# Patient Record
Sex: MALE | Race: WHITE | NOT HISPANIC OR LATINO | Employment: FULL TIME | URBAN - METROPOLITAN AREA
[De-identification: names, ages, dates, MRNs, and addresses within clinical notes are randomized per-mention and may not be internally consistent; named-entity substitution may affect disease eponyms.]

---

## 2020-10-15 ENCOUNTER — OFFICE VISIT (OUTPATIENT)
Dept: MEDICAL GROUP | Facility: MEDICAL CENTER | Age: 35
End: 2020-10-15
Payer: COMMERCIAL

## 2020-10-15 ENCOUNTER — TELEPHONE (OUTPATIENT)
Dept: SCHEDULING | Facility: IMAGING CENTER | Age: 35
End: 2020-10-15

## 2020-10-15 VITALS
OXYGEN SATURATION: 96 % | BODY MASS INDEX: 33.09 KG/M2 | TEMPERATURE: 98.2 F | DIASTOLIC BLOOD PRESSURE: 78 MMHG | HEIGHT: 72 IN | RESPIRATION RATE: 16 BRPM | HEART RATE: 80 BPM | WEIGHT: 244.27 LBS | SYSTOLIC BLOOD PRESSURE: 130 MMHG

## 2020-10-15 DIAGNOSIS — Z23 NEED FOR VACCINATION: ICD-10-CM

## 2020-10-15 DIAGNOSIS — E11.9 TYPE 2 DIABETES MELLITUS WITHOUT COMPLICATION, WITHOUT LONG-TERM CURRENT USE OF INSULIN (HCC): ICD-10-CM

## 2020-10-15 DIAGNOSIS — Z00.00 ENCOUNTER FOR PREVENTIVE CARE: ICD-10-CM

## 2020-10-15 LAB
HBA1C MFR BLD: 11.1 % (ref 0–5.6)
INT CON NEG: ABNORMAL
INT CON POS: ABNORMAL

## 2020-10-15 PROCEDURE — 90686 IIV4 VACC NO PRSV 0.5 ML IM: CPT | Performed by: INTERNAL MEDICINE

## 2020-10-15 PROCEDURE — 99204 OFFICE O/P NEW MOD 45 MIN: CPT | Mod: 25 | Performed by: INTERNAL MEDICINE

## 2020-10-15 PROCEDURE — 90471 IMMUNIZATION ADMIN: CPT | Performed by: INTERNAL MEDICINE

## 2020-10-15 PROCEDURE — 83036 HEMOGLOBIN GLYCOSYLATED A1C: CPT | Performed by: INTERNAL MEDICINE

## 2020-10-15 RX ORDER — METFORMIN HYDROCHLORIDE 500 MG/1
500 TABLET, EXTENDED RELEASE ORAL 2 TIMES DAILY
Qty: 60 TAB | Refills: 2 | Status: SHIPPED | OUTPATIENT
Start: 2020-10-15 | End: 2020-11-19 | Stop reason: SDUPTHER

## 2020-10-15 RX ORDER — INSULIN GLARGINE 100 [IU]/ML
INJECTION, SOLUTION SUBCUTANEOUS
Qty: 4 EACH | Refills: 3 | Status: SHIPPED | OUTPATIENT
Start: 2020-10-15 | End: 2020-10-26

## 2020-10-15 ASSESSMENT — PATIENT HEALTH QUESTIONNAIRE - PHQ9: CLINICAL INTERPRETATION OF PHQ2 SCORE: 0

## 2020-10-15 NOTE — ASSESSMENT & PLAN NOTE
Diabetic diet, monitor BG    Ref. Range 10/15/2020 16:03   Glycohemoglobin Latest Ref Range: 0.0 - 5.6 % 11.1 (A)   Reinitiate metformin 500 mg BID  Initiate 10 units every evening, increase 2 units every 3 days, until fasting blood glucose level are in target range ( mg/dL)  If hypoglycemia occurs, or fasting level < 80 mg/dL, reduce bedtime dose by 4 units or 10%- whichever is greater.  Monofilament testing with a 10 gram force: sensation intact: intact bilaterally  Visual Inspection: Feet with maceration of 4th toe web bilaterally, recommend topical OTC antifungal  Pedal pulses: intact bilaterally

## 2020-10-15 NOTE — PROGRESS NOTES
New Patient to Establish    Reason to establish: New patient to establish    Joaquin Sepulveda is a 35 y.o. male who presents today with the following:    CC:   Chief Complaint   Patient presents with   • Establish Care   • Diabetes   • Immunizations       HPI:     Type 2 diabetes mellitus without complication, without long-term current use of insulin (HCC)  Diabetic diet, monitor BG    Ref. Range 10/15/2020 16:03   Glycohemoglobin Latest Ref Range: 0.0 - 5.6 % 11.1 (A)   Reinitiate metformin 500 mg BID  Initiate 10 units every evening, increase 2 units every 3 days, until fasting blood glucose level are in target range ( mg/dL)  If hypoglycemia occurs, or fasting level < 80 mg/dL, reduce bedtime dose by 4 units or 10%- whichever is greater.  Monofilament testing with a 10 gram force: sensation intact: intact bilaterally  Visual Inspection: Feet with maceration of 4th toe web bilaterally, recommend topical OTC antifungal  Pedal pulses: intact bilaterally            Current Outpatient Medications:   •  Insulin Pen Needle 32 G x 4 mm, Use one pen needle in pen device to inject insulin once daily., Disp: 100 Each, Rfl: 3  •  insulin glargine (LANTUS SOLOSTAR) 100 UNIT/ML Solution Pen-injector injection, Initiate 10 units every evening, increase 2 units every 3 days, until fasting blood glucose level are in target range ( mg/dL) If hypoglycemia occurs, or fasting level < 80 mg/dL, reduce bedtime dose by 4 units or 10%- whichever is greater., Disp: 4 Each, Rfl: 3  •  metFORMIN ER (GLUCOPHAGE XR) 500 MG TABLET SR 24 HR, Take 1 Tab by mouth 2 times a day., Disp: 60 Tab, Rfl: 2    Allergies, past medical history, past surgical history, medications, family history, social history reviewed and updated.    ROS     Constitutional: Denies fevers or chills  Eyes: Denies changes in vision  Ears/Nose/Throat/Mouth: Denies nasal congestion or sore throat   Cardiovascular: Denies chest pain or palpitations    Respiratory: Denies shortness of breath , Denies cough  Gastrointestinal/Hepatic: Denies abd pain, nausea, vomiting   Genitourinary: Denies dysuria or frequency  Musculoskeletal/Rheum: Denies joint pain and swelling   Neurological: Denies headache  Psychiatric: Denies mood disorder   Endocrine:  hx of diabetes Denies thyroid dysfunction  Heme/Oncology/Lymph Nodes: Denies weight changes or enlarged LNs.    Physical Exam  /78 (BP Location: Left arm, Patient Position: Sitting, BP Cuff Size: Adult)   Pulse 80   Temp 36.8 °C (98.2 °F) (Temporal)   Resp 16   Ht 1.829 m (6')   Wt 110.8 kg (244 lb 4.3 oz)   SpO2 96%   BMI 33.13 kg/m²   General: Normal appearance.  Well developed, well nourished, no acute distress.  HEENT: Normocephalic.  Extraocular motion intact. Pupils are equally round, reactive to light and accommodation, conjunctiva clear, no scleral icterus.  Ears: normal shape and contour, ear canals clear, tympanic membranes intact. Hearing intact.  Oropharynx clear, no erythema, edema or exudate noted.  NECK: Thyroid is not enlarged. No JVD.  No carotid bruits. No masses.  Cardiovascular: Regular rhythm and rate. No murmur/rubs/gallops.   Respiratory: Normal respiratory effort, clear to auscultation bilaterally. No wheezing/rales/rhonchi.    Abdomen: Bowel sounds present, soft, nontender, nondistended, no rebound, no guarding. No hepatosplenomegaly.  : No suprapubic tenderness. No CVA tenderness.   EXT: no LE edema b/l. No cyanosis.  No clubbing.  Lymph: No cervical, supraclavicular or axillary lymph nodes are palpable  Skin: Warm and dry.  No suspicious lesions or rashes.   Neurologic: No focal deficits.    Psych: AAOx3,  Normal mood and affect, normal judgment and insight, memory within normal limits        Assessment and Plan    1. Type 2 diabetes mellitus without complication, without long-term current use of insulin (HCC)  - POCT  A1C 11.1  Diabetic diet, monitor fasting BG  - Comp Metabolic  Panel; Future  - Lipid Profile; Future  - REFERRAL TO OPHTHALMOLOGY  - Diabetic Monofilament LE Exam  - MICROALBUMIN CREAT RATIO URINE; Future  - CORNELIO-65; Future  - INSULIN ANTIBODIES; Future  - Insulin Pen Needle 32 G x 4 mm; Use one pen needle in pen device to inject insulin once daily.  Dispense: 100 Each; Refill: 3  - insulin glargine (LANTUS SOLOSTAR) 100 UNIT/ML Solution Pen-injector injection; Initiate 10 units every evening, increase 2 units every 3 days, until fasting blood glucose level are in target range ( mg/dL) If hypoglycemia occurs, or fasting level < 80 mg/dL, reduce bedtime dose by 4 units or 10%- whichever is greater.  Dispense: 4 Each; Refill: 3  - REFERRAL TO ENDOCRINOLOGY  - REFERRAL TO DIABETIC EDUCATION Diabetes Self Management Education / Training (DSME/T) and Medical Nutrition Therapy (MNT): Initial Group DSME/MNT as authorized by payor, Follow-Up DSME/MNT as authorized by payor; DSME/T Content: Monitoring Diabete...  - metFORMIN ER (GLUCOPHAGE XR) 500 MG TABLET SR 24 HR; Take 1 Tab by mouth 2 times a day.  Dispense: 60 Tab; Refill: 2    2. Need for vaccination  - Influenza Vaccine Quad Injection (PF)    3. Encounter for preventive care  - CBC WITH DIFFERENTIAL; Future  - TSH WITH REFLEX TO FT4; Future  - URINALYSIS; Future  - VITAMIN D,25 HYDROXY; Future        Follow-up:Return in about 1 month (around 11/15/2020), or if symptoms worsen or fail to improve, for Long.    This note was created using voice recognition software. There may be unintended errors in spelling, grammar or content.

## 2020-10-20 ENCOUNTER — HOSPITAL ENCOUNTER (OUTPATIENT)
Dept: LAB | Facility: MEDICAL CENTER | Age: 35
End: 2020-10-20
Attending: INTERNAL MEDICINE
Payer: COMMERCIAL

## 2020-10-20 DIAGNOSIS — Z00.00 ENCOUNTER FOR PREVENTIVE CARE: ICD-10-CM

## 2020-10-20 DIAGNOSIS — E11.9 TYPE 2 DIABETES MELLITUS WITHOUT COMPLICATION, WITHOUT LONG-TERM CURRENT USE OF INSULIN (HCC): ICD-10-CM

## 2020-10-20 LAB
25(OH)D3 SERPL-MCNC: 14 NG/ML (ref 30–100)
ALBUMIN SERPL BCP-MCNC: 4.7 G/DL (ref 3.2–4.9)
ALBUMIN/GLOB SERPL: 1.3 G/DL
ALP SERPL-CCNC: 115 U/L (ref 30–99)
ALT SERPL-CCNC: 38 U/L (ref 2–50)
ANION GAP SERPL CALC-SCNC: 12 MMOL/L (ref 7–16)
APPEARANCE UR: CLEAR
AST SERPL-CCNC: 24 U/L (ref 12–45)
BASOPHILS # BLD AUTO: 0.7 % (ref 0–1.8)
BASOPHILS # BLD: 0.06 K/UL (ref 0–0.12)
BILIRUB SERPL-MCNC: 0.4 MG/DL (ref 0.1–1.5)
BILIRUB UR QL STRIP.AUTO: NEGATIVE
BUN SERPL-MCNC: 10 MG/DL (ref 8–22)
CALCIUM SERPL-MCNC: 9.8 MG/DL (ref 8.4–10.2)
CHLORIDE SERPL-SCNC: 103 MMOL/L (ref 96–112)
CHOLEST SERPL-MCNC: 231 MG/DL (ref 100–199)
CO2 SERPL-SCNC: 22 MMOL/L (ref 20–33)
COLOR UR: YELLOW
CREAT SERPL-MCNC: 0.75 MG/DL (ref 0.5–1.4)
CREAT UR-MCNC: 48.2 MG/DL
EOSINOPHIL # BLD AUTO: 0.11 K/UL (ref 0–0.51)
EOSINOPHIL NFR BLD: 1.3 % (ref 0–6.9)
ERYTHROCYTE [DISTWIDTH] IN BLOOD BY AUTOMATED COUNT: 38.3 FL (ref 35.9–50)
FASTING STATUS PATIENT QL REPORTED: NORMAL
GLOBULIN SER CALC-MCNC: 3.5 G/DL (ref 1.9–3.5)
GLUCOSE SERPL-MCNC: 153 MG/DL (ref 65–99)
GLUCOSE UR STRIP.AUTO-MCNC: NEGATIVE MG/DL
HCT VFR BLD AUTO: 47.7 % (ref 42–52)
HDLC SERPL-MCNC: 40 MG/DL
HGB BLD-MCNC: 16.1 G/DL (ref 14–18)
IMM GRANULOCYTES # BLD AUTO: 0.02 K/UL (ref 0–0.11)
IMM GRANULOCYTES NFR BLD AUTO: 0.2 % (ref 0–0.9)
KETONES UR STRIP.AUTO-MCNC: NEGATIVE MG/DL
LDLC SERPL CALC-MCNC: 152 MG/DL
LEUKOCYTE ESTERASE UR QL STRIP.AUTO: NEGATIVE
LYMPHOCYTES # BLD AUTO: 3.74 K/UL (ref 1–4.8)
LYMPHOCYTES NFR BLD: 43.4 % (ref 22–41)
MCH RBC QN AUTO: 26.7 PG (ref 27–33)
MCHC RBC AUTO-ENTMCNC: 33.8 G/DL (ref 33.7–35.3)
MCV RBC AUTO: 79.2 FL (ref 81.4–97.8)
MICRO URNS: NORMAL
MICROALBUMIN UR-MCNC: 1.7 MG/DL
MICROALBUMIN/CREAT UR: 35 MG/G (ref 0–30)
MONOCYTES # BLD AUTO: 0.57 K/UL (ref 0–0.85)
MONOCYTES NFR BLD AUTO: 6.6 % (ref 0–13.4)
NEUTROPHILS # BLD AUTO: 4.12 K/UL (ref 1.82–7.42)
NEUTROPHILS NFR BLD: 47.8 % (ref 44–72)
NITRITE UR QL STRIP.AUTO: NEGATIVE
NRBC # BLD AUTO: 0 K/UL
NRBC BLD-RTO: 0 /100 WBC
PH UR STRIP.AUTO: 6 [PH] (ref 5–8)
PLATELET # BLD AUTO: 268 K/UL (ref 164–446)
PMV BLD AUTO: 10 FL (ref 9–12.9)
POTASSIUM SERPL-SCNC: 4.4 MMOL/L (ref 3.6–5.5)
PROT SERPL-MCNC: 8.2 G/DL (ref 6–8.2)
PROT UR QL STRIP: NEGATIVE MG/DL
RBC # BLD AUTO: 6.02 M/UL (ref 4.7–6.1)
RBC UR QL AUTO: NEGATIVE
SODIUM SERPL-SCNC: 137 MMOL/L (ref 135–145)
SP GR UR STRIP.AUTO: 1.01
TRIGL SERPL-MCNC: 193 MG/DL (ref 0–149)
TSH SERPL DL<=0.005 MIU/L-ACNC: 1.46 UIU/ML (ref 0.38–5.33)
WBC # BLD AUTO: 8.6 K/UL (ref 4.8–10.8)

## 2020-10-20 PROCEDURE — 36415 COLL VENOUS BLD VENIPUNCTURE: CPT

## 2020-10-20 PROCEDURE — 84443 ASSAY THYROID STIM HORMONE: CPT

## 2020-10-20 PROCEDURE — 81003 URINALYSIS AUTO W/O SCOPE: CPT

## 2020-10-20 PROCEDURE — 86341 ISLET CELL ANTIBODY: CPT

## 2020-10-20 PROCEDURE — 80061 LIPID PANEL: CPT

## 2020-10-20 PROCEDURE — 85025 COMPLETE CBC W/AUTO DIFF WBC: CPT

## 2020-10-20 PROCEDURE — 82570 ASSAY OF URINE CREATININE: CPT

## 2020-10-20 PROCEDURE — 82043 UR ALBUMIN QUANTITATIVE: CPT

## 2020-10-20 PROCEDURE — 86337 INSULIN ANTIBODIES: CPT

## 2020-10-20 PROCEDURE — 80053 COMPREHEN METABOLIC PANEL: CPT

## 2020-10-20 PROCEDURE — 82306 VITAMIN D 25 HYDROXY: CPT

## 2020-10-23 LAB — GAD65 AB SER IA-ACNC: <5 IU/ML (ref 0–5)

## 2020-10-24 LAB — INSULIN HUMAN AB SER-ACNC: <0.4 U/ML (ref 0–0.4)

## 2020-10-26 DIAGNOSIS — E11.9 TYPE 2 DIABETES MELLITUS WITHOUT COMPLICATION, WITHOUT LONG-TERM CURRENT USE OF INSULIN (HCC): ICD-10-CM

## 2020-10-26 RX ORDER — INSULIN GLARGINE 100 [IU]/ML
INJECTION, SOLUTION SUBCUTANEOUS
Qty: 15 ML | Refills: 1 | Status: SHIPPED | OUTPATIENT
Start: 2020-10-26 | End: 2020-11-19

## 2020-10-26 NOTE — PROGRESS NOTES
Called pharmacy, we compared the cost for lantus solostar, toujeo solostar and basaglar kwikPen  The basaglar is the lowest of the three.     Type 2 diabetes mellitus without complication, without long-term current use of insulin (HCC)  -     insulin glargine (BASAGLAR KWIKPEN) 100 UNIT/ML injection PEN; Initiate 10 units every evening, increase 2 units every 3 days, until fasting blood glucose level are in target range ( mg/dL) If hypoglycemia occurs, or fasting level < 80 mg/dL, reduce bedtime dose by 4 units or 10%- whichever is greater.

## 2020-11-19 ENCOUNTER — OFFICE VISIT (OUTPATIENT)
Dept: MEDICAL GROUP | Facility: MEDICAL CENTER | Age: 35
End: 2020-11-19
Payer: COMMERCIAL

## 2020-11-19 VITALS
DIASTOLIC BLOOD PRESSURE: 70 MMHG | HEART RATE: 76 BPM | OXYGEN SATURATION: 96 % | TEMPERATURE: 98.5 F | RESPIRATION RATE: 16 BRPM | BODY MASS INDEX: 33.04 KG/M2 | WEIGHT: 243.94 LBS | SYSTOLIC BLOOD PRESSURE: 104 MMHG | HEIGHT: 72 IN

## 2020-11-19 DIAGNOSIS — E55.9 VITAMIN D DEFICIENCY: ICD-10-CM

## 2020-11-19 DIAGNOSIS — R71.8 MICROCYTOSIS: ICD-10-CM

## 2020-11-19 DIAGNOSIS — R80.9 MICROALBUMINURIA: ICD-10-CM

## 2020-11-19 DIAGNOSIS — R74.8 ELEVATED ALKALINE PHOSPHATASE LEVEL: ICD-10-CM

## 2020-11-19 DIAGNOSIS — E78.5 DYSLIPIDEMIA: ICD-10-CM

## 2020-11-19 DIAGNOSIS — E11.9 TYPE 2 DIABETES MELLITUS WITHOUT COMPLICATION, WITHOUT LONG-TERM CURRENT USE OF INSULIN (HCC): ICD-10-CM

## 2020-11-19 PROCEDURE — 99214 OFFICE O/P EST MOD 30 MIN: CPT | Performed by: INTERNAL MEDICINE

## 2020-11-19 RX ORDER — METFORMIN HYDROCHLORIDE 500 MG/1
500 TABLET, EXTENDED RELEASE ORAL 2 TIMES DAILY
Qty: 60 TAB | Refills: 5 | Status: SHIPPED | OUTPATIENT
Start: 2020-11-19 | End: 2021-02-16

## 2020-11-19 RX ORDER — LISINOPRIL 2.5 MG/1
2.5 TABLET ORAL DAILY
Qty: 30 TAB | Refills: 5 | Status: SHIPPED | OUTPATIENT
Start: 2020-11-19 | End: 2021-02-02

## 2020-11-19 RX ORDER — ROSUVASTATIN CALCIUM 5 MG/1
5 TABLET, COATED ORAL EVERY EVENING
Qty: 30 TAB | Refills: 5 | Status: SHIPPED | OUTPATIENT
Start: 2020-11-19 | End: 2021-05-17

## 2020-11-19 RX ORDER — ERGOCALCIFEROL 1.25 MG/1
50000 CAPSULE ORAL
Qty: 12 CAP | Refills: 0 | Status: SHIPPED | OUTPATIENT
Start: 2020-11-19 | End: 2021-02-16

## 2020-11-19 RX ORDER — INSULIN GLARGINE 100 [IU]/ML
INJECTION, SOLUTION SUBCUTANEOUS
Qty: 15 ML | Refills: 1 | COMMUNITY
Start: 2020-11-19 | End: 2021-02-16 | Stop reason: SDUPTHER

## 2020-11-19 ASSESSMENT — FIBROSIS 4 INDEX: FIB4 SCORE: 0.52

## 2020-11-19 NOTE — ASSESSMENT & PLAN NOTE
Ref. Range 10/20/2020 16:16   Cholesterol,Tot Latest Ref Range: 100 - 199 mg/dL 231 (H)   Triglycerides Latest Ref Range: 0 - 149 mg/dL 193 (H)   HDL Latest Ref Range: >=40 mg/dL 40   LDL Latest Ref Range: <100 mg/dL 152 (H)       Benefit and risks of statin discussed with patient include side effect such as muscle toxicity, patient was told if having side effects from medication, to stop the medication and notify healthcare provider.  Patient is agreeable with healthful diet, exercise and initiation of statin.

## 2020-11-19 NOTE — ASSESSMENT & PLAN NOTE
He donates blood three times a year   Ref. Range 10/20/2020 16:16   WBC Latest Ref Range: 4.8 - 10.8 K/uL 8.6   RBC Latest Ref Range: 4.70 - 6.10 M/uL 6.02   Hemoglobin Latest Ref Range: 14.0 - 18.0 g/dL 16.1   Hematocrit Latest Ref Range: 42.0 - 52.0 % 47.7   MCV Latest Ref Range: 81.4 - 97.8 fL 79.2 (L)   MCH Latest Ref Range: 27.0 - 33.0 pg 26.7 (L)   MCHC Latest Ref Range: 33.7 - 35.3 g/dL 33.8   RDW Latest Ref Range: 35.9 - 50.0 fL 38.3   Platelet Count Latest Ref Range: 164 - 446 K/uL 268   MPV Latest Ref Range: 9.0 - 12.9 fL 10.0

## 2020-11-19 NOTE — ASSESSMENT & PLAN NOTE
Initiate lisinopril   Ref. Range 10/20/2020 16:17   Micro Alb Creat Ratio Latest Ref Range: 0 - 30 mg/g 35 (H)   Creatinine, Urine Latest Units: mg/dL 48.20   Microalbumin, Urine Random Latest Units: mg/dL 1.7

## 2020-11-19 NOTE — ASSESSMENT & PLAN NOTE
Diabetic diet, monitor BG    Ref. Range 10/15/2020 16:03   Glycohemoglobin Latest Ref Range: 0.0 - 5.6 % 11.1 (A)   metformin 500 mg BID  Initiate 10 units every evening, increase 2 units every 3 days, until fasting blood glucose level are in target range ( mg/dL), currently at 28 units  If hypoglycemia occurs, or fasting level < 80 mg/dL, reduce bedtime dose by 4 units or 10%- whichever is greater.  10/15/2020 Monofilament testing with a 10 gram force: sensation intact: intact bilaterally  Visual Inspection: Feet with maceration of 4th toe web bilaterally, recommend topical OTC antifungal  Pedal pulses: intact bilaterally

## 2020-11-19 NOTE — PROGRESS NOTES
Established Patient    Joaquin Sepulveda is a 35 y.o. male who presents today with the following:    CC:   Chief Complaint   Patient presents with   • Follow-Up     Diabetes       HPI:     Microalbuminuria  Initiate lisinopril   Ref. Range 10/20/2020 16:17   Micro Alb Creat Ratio Latest Ref Range: 0 - 30 mg/g 35 (H)   Creatinine, Urine Latest Units: mg/dL 48.20   Microalbumin, Urine Random Latest Units: mg/dL 1.7         Type 2 diabetes mellitus without complication, without long-term current use of insulin (HCC)  Diabetic diet, monitor BG    Ref. Range 10/15/2020 16:03   Glycohemoglobin Latest Ref Range: 0.0 - 5.6 % 11.1 (A)   metformin 500 mg BID  Initiate 10 units every evening, increase 2 units every 3 days, until fasting blood glucose level are in target range ( mg/dL), currently at 28 units  If hypoglycemia occurs, or fasting level < 80 mg/dL, reduce bedtime dose by 4 units or 10%- whichever is greater.  10/15/2020 Monofilament testing with a 10 gram force: sensation intact: intact bilaterally  Visual Inspection: Feet with maceration of 4th toe web bilaterally, recommend topical OTC antifungal  Pedal pulses: intact bilaterally        Dyslipidemia     Ref. Range 10/20/2020 16:16   Cholesterol,Tot Latest Ref Range: 100 - 199 mg/dL 231 (H)   Triglycerides Latest Ref Range: 0 - 149 mg/dL 193 (H)   HDL Latest Ref Range: >=40 mg/dL 40   LDL Latest Ref Range: <100 mg/dL 152 (H)       Benefit and risks of statin discussed with patient include side effect such as muscle toxicity, patient was told if having side effects from medication, to stop the medication and notify healthcare provider.  Patient is agreeable with healthful diet, exercise and initiation of statin.        Microcytosis  He donates blood three times a year   Ref. Range 10/20/2020 16:16   WBC Latest Ref Range: 4.8 - 10.8 K/uL 8.6   RBC Latest Ref Range: 4.70 - 6.10 M/uL 6.02   Hemoglobin Latest Ref Range: 14.0 - 18.0 g/dL 16.1   Hematocrit  Latest Ref Range: 42.0 - 52.0 % 47.7   MCV Latest Ref Range: 81.4 - 97.8 fL 79.2 (L)   MCH Latest Ref Range: 27.0 - 33.0 pg 26.7 (L)   MCHC Latest Ref Range: 33.7 - 35.3 g/dL 33.8   RDW Latest Ref Range: 35.9 - 50.0 fL 38.3   Platelet Count Latest Ref Range: 164 - 446 K/uL 268   MPV Latest Ref Range: 9.0 - 12.9 fL 10.0         Elevated alkaline phosphatase level     Ref. Range 10/20/2020 16:16   Alkaline Phosphatase Latest Ref Range: 30 - 99 U/L 115 (H)           Current Outpatient Medications   Medication Sig Dispense Refill   • rosuvastatin (CRESTOR) 5 MG Tab Take 1 Tab by mouth every evening. 30 Tab 5   • lisinopril (PRINIVIL) 2.5 MG Tab Take 1 Tab by mouth every day. 30 Tab 5   • metFORMIN ER (GLUCOPHAGE XR) 500 MG TABLET SR 24 HR Take 1 Tab by mouth 2 times a day. 60 Tab 5   • vitamin D, Ergocalciferol, (DRISDOL) 1.25 MG (28063 UT) Cap capsule Take 1 Cap by mouth every 7 days. 12 Cap 0   • insulin glargine (BASAGLAR KWIKPEN) 100 UNIT/ML injection PEN Initiate 28 units every evening, increase 2 units every 3 days, until fasting blood glucose level are in target range ( mg/dL) If hypoglycemia occurs, or fasting level < 80 mg/dL, reduce bedtime dose by 4 units or 10%- whichever is greater. 15 mL 1   • Insulin Pen Needle 32 G x 4 mm Use one pen needle in pen device to inject insulin once daily. 100 Each 3     No current facility-administered medications for this visit.        Allergies, past medical history, past surgical history, medications, family history, social history reviewed and updated.    ROS   Constitutional: Denies fevers or chills  Eyes: Denies changes in vision  Ears/Nose/Throat/Mouth: Denies nasal congestion or sore throat   Cardiovascular: Denies chest pain or palpitations   Respiratory: Denies shortness of breath , Denies cough  Gastrointestinal/Hepatic: Denies abd pain, nausea, vomiting   Genitourinary: Denies dysuria or frequency  Musculoskeletal/Rheum: Denies joint pain and swelling    Neurological: Denies headache  Psychiatric: Denies mood disorder   Endocrine:  hx of diabetes Denies thyroid dysfunction  Heme/Oncology/Lymph Nodes: Denies weight changes or enlarged LNs.    Physical Exam  Vitals: /70 (BP Location: Left arm, Patient Position: Sitting, BP Cuff Size: Adult)   Pulse 76   Temp 36.9 °C (98.5 °F) (Temporal)   Resp 16   Ht 1.829 m (6')   Wt 110.6 kg (243 lb 15 oz)   SpO2 96%   BMI 33.08 kg/m²   General: Alert, pleasant, NAD  HEENT: Normocephalic.  EOMI, no icterus or pallor.  Conjunctivae and lids normal. External ears normal. Oropharynx non-erythematous, mucous membranes moist.  Neck supple.  No thyromegaly or masses palpated.   Lymph: No cervical or supraclavicular lymphadenopathy.  Cardiovascular: Regular rate and rhythm.   Respiratory: Normal respiratory effort.  Clear to auscultation bilaterally.  Abdomen: Non-distended, soft  Skin: Warm, dry, no rashes.  Musculoskeletal: Gait is normal.  Moves all extremities well.  Extremities: No leg edema.    Psych:  Affect/mood is normal, judgement is good, memory is intact, grooming is appropriate.      Labs (10/20/20) were reviewed and discussed with patients.  All questions were answered.      Assessment and Plan    1. Type 2 diabetes mellitus without complication, without long-term current use of insulin (HCC)  - REFERRAL TO ENDOCRINOLOGY  - metFORMIN ER (GLUCOPHAGE XR) 500 MG TABLET SR 24 HR; Take 1 Tab by mouth 2 times a day.  Dispense: 60 Tab; Refill: 5  - insulin glargine (BASAGLAR KWIKPEN) 100 UNIT/ML injection PEN; Initiate 28 units every evening, increase 2 units every 3 days, until fasting blood glucose level are in target range ( mg/dL) If hypoglycemia occurs, or fasting level < 80 mg/dL, reduce bedtime dose by 4 units or 10%- whichever is greater.  Dispense: 15 mL; Refill: 1    2. Dyslipidemia  - rosuvastatin (CRESTOR) 5 MG Tab; Take 1 Tab by mouth every evening.  Dispense: 30 Tab; Refill: 5  Benefit and risks  of statin discussed with patient include side effect such as muscle toxicity, patient was told if having side effects from medication, to stop the medication and notify healthcare provider.  Patient is agreeable with healthful diet, exercise and initiation of statin.    3. Microalbuminuria  - lisinopril (PRINIVIL) 2.5 MG Tab; Take 1 Tab by mouth every day.  Dispense: 30 Tab; Refill: 5    4. Vitamin D deficiency  - vitamin D, Ergocalciferol, (DRISDOL) 1.25 MG (02913 UT) Cap capsule; Take 1 Cap by mouth every 7 days.  Dispense: 12 Cap; Refill: 0  Recommend Vitamin D3 50,000 international units weekly for 12 weeks, then vitamin D3 1354-6636 international units daily    5. Microcytosis  - FERRITIN; Future  - IRON/TOTAL IRON BIND; Future    6. Elevated alkaline phosphatase level  - ALKALINE PHOSPHATASE ISOENZYMES; Future  - GAMMA GT (GGT); Future      Follow-up:Return in about 3 months (around 2/19/2021), or if symptoms worsen or fail to improve.    This note was created using voice recognition software. There may be unintended errors in spelling, grammar or content.

## 2021-01-31 DIAGNOSIS — R80.9 MICROALBUMINURIA: ICD-10-CM

## 2021-02-02 RX ORDER — LISINOPRIL 2.5 MG/1
TABLET ORAL
Qty: 90 TAB | Refills: 2 | Status: SHIPPED | OUTPATIENT
Start: 2021-02-02 | End: 2021-10-25

## 2021-02-02 NOTE — TELEPHONE ENCOUNTER
Received request via: Pharmacy    Was the patient seen in the last year in this department? Yes    Does the patient have an active prescription (recently filled or refills available) for medication(s) requested? No     Requested Prescriptions     Pending Prescriptions Disp Refills   • lisinopril (PRINIVIL) 2.5 MG Tab [Pharmacy Med Name: LISINOPRIL 2.5 MG TABLET] 90 Tab 2     Sig: TAKE 1 TABLET BY MOUTH EVERY DAY       Pharmacy: REQUEST FOR 90 DAYS PRESCRIPTION. DX Code Needed.

## 2021-02-04 ENCOUNTER — TELEPHONE (OUTPATIENT)
Dept: MEDICAL GROUP | Facility: MEDICAL CENTER | Age: 36
End: 2021-02-04

## 2021-02-05 NOTE — TELEPHONE ENCOUNTER
1. Caller Name: Joaquin                        Call Back Number: [phone]      How would the patient prefer to be contacted with a response: Phone    Patient called to get current lab orders before his upcoming visit on 02/16. He needs to the orders for [facility]. He will come by the office to  the orders.

## 2021-02-10 ENCOUNTER — TELEPHONE (OUTPATIENT)
Dept: MEDICAL GROUP | Facility: MEDICAL CENTER | Age: 36
End: 2021-02-10

## 2021-02-10 NOTE — TELEPHONE ENCOUNTER
ESTABLISHED PATIENT PRE-VISIT PLANNING     Patient was NOT contacted to complete PVP.     Note: Patient will not be contacted if there is no indication to call.     1.  Reviewed notes from the last few office visits within the medical group: Yes    2.  If any orders were placed at last visit or intended to be done for this visit (i.e. 6 mos follow-up), do we have Results/Consult Notes?         •  Labs - Labs ordered, NOT completed  Note: If patient appointment is for lab review and patient did not complete labs, check with provider if OK to reschedule patient until labs completed.       •  Imaging - Imaging was not ordered at last office visit.       •  Referrals - Referral ordered, patient has NOT been seen.    3. Is this appointment scheduled as a Hospital Follow-Up? No    4.  Immunizations were updated in Epic using Reconcile Outside Information activity? Yes    5.  Patient is due for the following Health Maintenance Topics:   Health Maintenance Due   Topic Date Due   • IMM PNEUMOCOCCAL VACCINE: 0-64 Years (1 of 1 - PPSV23) 01/16/1990   • IMM HEP B VACCINE (1 of 3 - Risk 3-dose series) 01/16/2003       6.  AHA (Pulse8) form printed for Provider? N/A

## 2021-02-10 NOTE — TELEPHONE ENCOUNTER
Outside information NOT reconciled using the Meez feature. Per Georgina May, the Meez feature is down as of 02/09/2021 at 2:00pm. Will reconcile outside information at a later date.

## 2021-02-16 ENCOUNTER — OFFICE VISIT (OUTPATIENT)
Dept: MEDICAL GROUP | Facility: MEDICAL CENTER | Age: 36
End: 2021-02-16
Payer: COMMERCIAL

## 2021-02-16 VITALS
BODY MASS INDEX: 33.74 KG/M2 | DIASTOLIC BLOOD PRESSURE: 80 MMHG | WEIGHT: 249.12 LBS | SYSTOLIC BLOOD PRESSURE: 130 MMHG | OXYGEN SATURATION: 96 % | TEMPERATURE: 98.4 F | HEIGHT: 72 IN | RESPIRATION RATE: 16 BRPM | HEART RATE: 81 BPM

## 2021-02-16 DIAGNOSIS — R80.9 MICROALBUMINURIA: ICD-10-CM

## 2021-02-16 DIAGNOSIS — E55.9 VITAMIN D DEFICIENCY: ICD-10-CM

## 2021-02-16 DIAGNOSIS — E78.5 DYSLIPIDEMIA: ICD-10-CM

## 2021-02-16 DIAGNOSIS — E11.9 TYPE 2 DIABETES MELLITUS WITHOUT COMPLICATION, WITHOUT LONG-TERM CURRENT USE OF INSULIN (HCC): ICD-10-CM

## 2021-02-16 PROCEDURE — 99214 OFFICE O/P EST MOD 30 MIN: CPT | Performed by: INTERNAL MEDICINE

## 2021-02-16 RX ORDER — INSULIN GLARGINE 100 [IU]/ML
INJECTION, SOLUTION SUBCUTANEOUS
Qty: 30 ML | Refills: 6 | Status: SHIPPED | OUTPATIENT
Start: 2021-02-16 | End: 2021-03-19

## 2021-02-16 RX ORDER — METFORMIN HYDROCHLORIDE 500 MG/1
1000 TABLET, EXTENDED RELEASE ORAL 2 TIMES DAILY
Qty: 360 TABLET | Refills: 0 | Status: SHIPPED | OUTPATIENT
Start: 2021-02-16 | End: 2021-03-19 | Stop reason: SDUPTHER

## 2021-02-16 ASSESSMENT — FIBROSIS 4 INDEX: FIB4 SCORE: 0.54

## 2021-02-16 ASSESSMENT — PATIENT HEALTH QUESTIONNAIRE - PHQ9: CLINICAL INTERPRETATION OF PHQ2 SCORE: 0

## 2021-02-17 NOTE — ASSESSMENT & PLAN NOTE
Diabetic diet, monitor BG    Ref. Range 10/15/2020 16:03   Glycohemoglobin Latest Ref Range: 0.0 - 5.6 % 11.1 (A)   metformin ER will increase to 1000 mg BID  Initiated 10 units every evening, increase 2 units every 3 days, until fasting blood glucose level are in target range ( mg/dL), currently at 40 units  If hypoglycemia occurs, or fasting level < 80 mg/dL, reduce bedtime dose by 4 units or 10%- whichever is greater.  10/15/2020 Monofilament testing with a 10 gram force: sensation intact: intact bilaterally  Visual Inspection: Feet with maceration of 4th toe web bilaterally, recommend topical OTC antifungal  Pedal pulses: intact bilaterally

## 2021-02-17 NOTE — PROGRESS NOTES
Established Patient    Joaquin Sepluveda is a 36 y.o. male who presents today with the following:    CC:   Chief Complaint   Patient presents with   • Follow-Up     3 mo        HPI:     Type 2 diabetes mellitus without complication, without long-term current use of insulin (HCC)  Diabetic diet, monitor BG    Ref. Range 10/15/2020 16:03   Glycohemoglobin Latest Ref Range: 0.0 - 5.6 % 11.1 (A)   metformin ER will increase to 1000 mg BID  Initiated 10 units every evening, increase 2 units every 3 days, until fasting blood glucose level are in target range ( mg/dL), currently at 40 units  If hypoglycemia occurs, or fasting level < 80 mg/dL, reduce bedtime dose by 4 units or 10%- whichever is greater.  10/15/2020 Monofilament testing with a 10 gram force: sensation intact: intact bilaterally  Visual Inspection: Feet with maceration of 4th toe web bilaterally, recommend topical OTC antifungal  Pedal pulses: intact bilaterally        Microalbuminuria  On  lisinopril   Ref. Range 10/20/2020 16:17   Micro Alb Creat Ratio Latest Ref Range: 0 - 30 mg/g 35 (H)   Creatinine, Urine Latest Units: mg/dL 48.20   Microalbumin, Urine Random Latest Units: mg/dL 1.7         Vitamin D deficiency   D3 50,000 international units weekly for 12 weeks, then vitamin D3 2000 international units daily          Current Outpatient Medications   Medication Sig Dispense Refill   • insulin glargine (BASAGLAR KWIKPEN) 100 UNIT/ML injection PEN Initiate 40 units every evening, increase 2 units every 3 days, until fasting blood glucose level are in target range ( mg/dL) If hypoglycemia occurs, or fasting level < 80 mg/dL, reduce bedtime dose by 4 units or 10%- whichever is greater. 30 mL 6   • metFORMIN ER (GLUCOPHAGE XR) 500 MG TABLET SR 24 HR Take 2 Tablets by mouth 2 times a day for 90 days. 360 tablet 0   • lisinopril (PRINIVIL) 2.5 MG Tab TAKE 1 TABLET BY MOUTH EVERY DAY 90 Tab 2   • rosuvastatin (CRESTOR) 5 MG Tab Take 1 Tab by  mouth every evening. 30 Tab 5   • Insulin Pen Needle 32 G x 4 mm Use one pen needle in pen device to inject insulin once daily. 100 Each 3     No current facility-administered medications for this visit.       Allergies, past medical history, past surgical history, medications, family history, social history reviewed and updated.    ROS   Constitutional: Denies fevers or chills  Eyes: Denies changes in vision  Ears/Nose/Throat/Mouth: Denies nasal congestion or sore throat   Cardiovascular: Denies chest pain or palpitations   Respiratory: Denies shortness of breath , Denies cough  Gastrointestinal/Hepatic: Denies abd pain, nausea, vomiting   Genitourinary: Denies dysuria or frequency  Musculoskeletal/Rheum: Denies joint pain and swelling   Neurological: Denies headache  Psychiatric: Denies mood disorder   Endocrine: hx of diabetes Denies thyroid dysfunction  Heme/Oncology/Lymph Nodes: Denies weight changes or enlarged LNs.    Physical Exam  Vitals: /80 (BP Location: Left arm, Patient Position: Sitting, BP Cuff Size: Adult)   Pulse 81   Temp 36.9 °C (98.4 °F) (Temporal)   Resp 16   Ht 1.829 m (6')   Wt 113 kg (249 lb 1.9 oz)   SpO2 96%   BMI 33.79 kg/m²   General: Alert, pleasant, NAD  HEENT: Normocephalic.  EOMI, no icterus or pallor.  Conjunctivae and lids normal. External ears normal. Oropharynx non-erythematous, mucous membranes moist.  Neck supple.  No thyromegaly or masses palpated.   Lymph: No cervical or supraclavicular lymphadenopathy.  Cardiovascular: Regular rate and rhythm.    Respiratory: Normal respiratory effort.  Clear to auscultation bilaterally.  Abdomen: Non-distended, soft  Skin: Warm, dry, no rashes.  Musculoskeletal: Gait is normal.  Moves all extremities well.  Extremities: No leg edema.    Psych:  Affect/mood is normal, judgement is good, memory is intact, grooming is appropriate.      Assessment and Plan    1. Type 2 diabetes mellitus without complication, without long-term  current use of insulin ([facility])  - REFERRAL TO ENDOCRINOLOGY  - insulin glargine (BASAGLAR KWIKPEN) 100 UNIT/ML injection PEN; Initiate 40 units every evening, increase 2 units every 3 days, until fasting blood glucose level are in target range ( mg/dL) If hypoglycemia occurs, or fasting level < 80 mg/dL, reduce bedtime dose by 4 units or 10%- whichever is greater.  Dispense: 30 mL; Refill: 6  - metFORMIN ER (GLUCOPHAGE XR) 500 MG TABLET SR 24 HR; Take 2 Tablets by mouth 2 times a day for 90 days.  Dispense: 360 tablet; Refill: 0  - HEMOGLOBIN A1C; Future    2. Dyslipidemia  - Lipid Profile; Future    3. Microalbuminuria  Lisinopril    4. Vitamin D deficiency  replacement      Follow-up:Return in about 1 month (around 3/16/2021), or if symptoms worsen or fail to improve.    This note was created using voice recognition software. There may be unintended errors in spelling, grammar or content.

## 2021-02-17 NOTE — ASSESSMENT & PLAN NOTE
D3 50,000 international units weekly for 12 weeks, then vitamin D3 2000 international units daily

## 2021-02-17 NOTE — ASSESSMENT & PLAN NOTE
On  lisinopril   Ref. Range 10/20/2020 16:17   Micro Alb Creat Ratio Latest Ref Range: 0 - 30 mg/g 35 (H)   Creatinine, Urine Latest Units: mg/dL 48.20   Microalbumin, Urine Random Latest Units: mg/dL 1.7

## 2021-03-19 ENCOUNTER — OFFICE VISIT (OUTPATIENT)
Dept: MEDICAL GROUP | Facility: MEDICAL CENTER | Age: 36
End: 2021-03-19
Payer: COMMERCIAL

## 2021-03-19 VITALS
RESPIRATION RATE: 16 BRPM | BODY MASS INDEX: 33.74 KG/M2 | HEIGHT: 72 IN | HEART RATE: 88 BPM | DIASTOLIC BLOOD PRESSURE: 64 MMHG | SYSTOLIC BLOOD PRESSURE: 108 MMHG | WEIGHT: 249.12 LBS | OXYGEN SATURATION: 95 % | TEMPERATURE: 98.3 F

## 2021-03-19 DIAGNOSIS — Z23 NEED FOR VACCINATION: ICD-10-CM

## 2021-03-19 DIAGNOSIS — R71.8 MICROCYTOSIS: ICD-10-CM

## 2021-03-19 DIAGNOSIS — E11.9 TYPE 2 DIABETES MELLITUS WITHOUT COMPLICATION, WITHOUT LONG-TERM CURRENT USE OF INSULIN (HCC): ICD-10-CM

## 2021-03-19 DIAGNOSIS — E55.9 VITAMIN D DEFICIENCY: ICD-10-CM

## 2021-03-19 DIAGNOSIS — R74.8 ELEVATED ALKALINE PHOSPHATASE LEVEL: ICD-10-CM

## 2021-03-19 DIAGNOSIS — R80.9 MICROALBUMINURIA: ICD-10-CM

## 2021-03-19 DIAGNOSIS — E78.5 DYSLIPIDEMIA: ICD-10-CM

## 2021-03-19 PROBLEM — N20.0 NEPHROLITHIASIS: Status: ACTIVE | Noted: 2021-03-19

## 2021-03-19 PROBLEM — N20.0 NEPHROLITHIASIS: Status: RESOLVED | Noted: 2021-03-19 | Resolved: 2021-03-19

## 2021-03-19 PROCEDURE — 90471 IMMUNIZATION ADMIN: CPT | Performed by: INTERNAL MEDICINE

## 2021-03-19 PROCEDURE — 90732 PPSV23 VACC 2 YRS+ SUBQ/IM: CPT | Performed by: INTERNAL MEDICINE

## 2021-03-19 PROCEDURE — 99214 OFFICE O/P EST MOD 30 MIN: CPT | Mod: 25 | Performed by: INTERNAL MEDICINE

## 2021-03-19 RX ORDER — METFORMIN HYDROCHLORIDE 500 MG/1
1000 TABLET, EXTENDED RELEASE ORAL 2 TIMES DAILY
Qty: 360 TABLET | Refills: 3 | Status: SHIPPED | OUTPATIENT
Start: 2021-03-19 | End: 2022-04-22

## 2021-03-19 RX ORDER — INSULIN GLARGINE 100 [IU]/ML
60 INJECTION, SOLUTION SUBCUTANEOUS EVERY EVENING
Qty: 30 ML | Refills: 3 | COMMUNITY
Start: 2021-03-19

## 2021-03-19 ASSESSMENT — FIBROSIS 4 INDEX: FIB4 SCORE: 0.54

## 2021-03-19 NOTE — ASSESSMENT & PLAN NOTE
He donates blood three times a year   Ref. Range 10/20/2020 16:16   WBC Latest Ref Range: 4.8 - 10.8 K/uL 8.6   RBC Latest Ref Range: 4.70 - 6.10 M/uL 6.02   Hemoglobin Latest Ref Range: 14.0 - 18.0 g/dL 16.1   Hematocrit Latest Ref Range: 42.0 - 52.0 % 47.7   MCV Latest Ref Range: 81.4 - 97.8 fL 79.2 (L)   MCH Latest Ref Range: 27.0 - 33.0 pg 26.7 (L)   MCHC Latest Ref Range: 33.7 - 35.3 g/dL 33.8   RDW Latest Ref Range: 35.9 - 50.0 fL 38.3   Platelet Count Latest Ref Range: 164 - 446 K/uL 268   MPV Latest Ref Range: 9.0 - 12.9 fL 10.0       February 9, 2021 Iron 78, TIBC 359, iron saturation 22%,13 197

## 2021-03-19 NOTE — PROGRESS NOTES
Established Patient    Joaquin Sepulveda is a 36 y.o. male who presents today with the following:    CC:   Chief Complaint   Patient presents with   • Lab Results, diabetes       HPI:     Type 2 diabetes mellitus without complication, without long-term current use of insulin (HCC)  Diabetic diet, monitor BG , Pending appointment with endocrinology in May 2021  March 10, 2021 A1c 8.8.   Ref. Range 10/15/2020 16:03   Glycohemoglobin Latest Ref Range: 0.0 - 5.6 % 11.1 (A)   metformin ER 1000 mg BID  Currently using Basaglar pen 60 units every evening.  If hypoglycemia occurs, or fasting level < 80 mg/dL, reduce bedtime dose by 4 units or 10%- whichever is greater.  10/15/2020 Monofilament testing with a 10 gram force: sensation intact: intact bilaterally  Visual Inspection: Feet with maceration of 4th toe web bilaterally, recommend topical OTC antifungal  Pedal pulses: intact bilaterally        Dyslipidemia  March 10, 2021 total cholesterol 148, triglyceride 151, HDL 38, LDL 84  Controlled on Crestor 5 mg daily   Ref. Range 10/20/2020 16:16   Cholesterol,Tot Latest Ref Range: 100 - 199 mg/dL 231 (H)   Triglycerides Latest Ref Range: 0 - 149 mg/dL 193 (H)   HDL Latest Ref Range: >=40 mg/dL 40   LDL Latest Ref Range: <100 mg/dL 152 (H)             Vitamin D deficiency   D3 50,000 international units weekly for 12 weeks, then vitamin D3 2000 international units daily        Microalbuminuria  On  lisinopril   Ref. Range 10/20/2020 16:17   Micro Alb Creat Ratio Latest Ref Range: 0 - 30 mg/g 35 (H)   Creatinine, Urine Latest Units: mg/dL 48.20   Microalbumin, Urine Random Latest Units: mg/dL 1.7         Microcytosis  He donates blood three times a year   Ref. Range 10/20/2020 16:16   WBC Latest Ref Range: 4.8 - 10.8 K/uL 8.6   RBC Latest Ref Range: 4.70 - 6.10 M/uL 6.02   Hemoglobin Latest Ref Range: 14.0 - 18.0 g/dL 16.1   Hematocrit Latest Ref Range: 42.0 - 52.0 % 47.7   MCV Latest Ref Range: 81.4 - 97.8 fL 79.2 (L)    MCH Latest Ref Range: 27.0 - 33.0 pg 26.7 (L)   MCHC Latest Ref Range: 33.7 - 35.3 g/dL 33.8   RDW Latest Ref Range: 35.9 - 50.0 fL 38.3   Platelet Count Latest Ref Range: 164 - 446 K/uL 268   MPV Latest Ref Range: 9.0 - 12.9 fL 10.0       February 9, 2021 Iron 78, TIBC 359, iron saturation 22%,13 197      Elevated alkaline phosphatase level     Ref. Range 10/20/2020 16:16   Alkaline Phosphatase Latest Ref Range: 30 - 99 U/L 115 (H)   February 2021 Alkaline phosphatase isoenzyme liver fraction 56%, bone fraction 24%, within normal limits.  Intestinal fraction 20% slightly elevated, upper limit normal of 18%.  He does not have any GI symptoms.  We will continue to monitor.        Current Outpatient Medications   Medication Sig Dispense Refill   • insulin glargine (INSULIN GLARGINE) 100 UNIT/ML Solution Pen-injector injection Inject 60 Units under the skin every evening. 30 mL 3   • metFORMIN ER (GLUCOPHAGE XR) 500 MG TABLET SR 24 HR Take 2 Tablets by mouth 2 times a day for 90 days. 360 tablet 3   • lisinopril (PRINIVIL) 2.5 MG Tab TAKE 1 TABLET BY MOUTH EVERY DAY 90 Tab 2   • rosuvastatin (CRESTOR) 5 MG Tab Take 1 Tab by mouth every evening. 30 Tab 5   • Insulin Pen Needle 32 G x 4 mm Use one pen needle in pen device to inject insulin once daily. 100 Each 3     No current facility-administered medications for this visit.       Allergies, past medical history, past surgical history, medications, family history, social history reviewed and updated.    ROS   Constitutional: Denies fevers or chills  Eyes: Denies changes in vision  Ears/Nose/Throat/Mouth: Denies nasal congestion or sore throat   Cardiovascular: Denies chest pain or palpitations   Respiratory: Denies shortness of breath , Denies cough  Gastrointestinal/Hepatic: Denies abd pain, nausea, vomiting   Genitourinary: Denies dysuria or frequency  Musculoskeletal/Rheum: Denies joint pain and swelling   Neurological: Denies headache  Psychiatric: Denies mood  "disorder   Endocrine: hx of diabetes Denies othyroid dysfunction  Heme/Oncology/Lymph Nodes: Denies weight changes or enlarged LNs.    Physical Exam  Vitals: /64 (BP Location: Left arm, Patient Position: Sitting, BP Cuff Size: Adult)   Pulse 88   Temp 36.8 °C (98.3 °F) (Temporal)   Resp 16   Ht 1.82 m (5' 11.65\")   Wt 113 kg (249 lb 1.9 oz)   SpO2 95%   BMI 34.11 kg/m²   General: Alert, pleasant, NAD  HEENT: Normocephalic.  EOMI, no icterus or pallor.  Conjunctivae and lids normal. External ears normal. Wearing a mask. Oropharynx non-erythematous, mucous membranes moist.  Neck supple.  No thyromegaly or masses palpated.   Lymph: No cervical or supraclavicular lymphadenopathy.  Cardiovascular: Regular rate and rhythm.  S1 and S2 normal.  No murmurs appreciated.  Respiratory: Normal respiratory effort.  Clear to auscultation bilaterally.  Abdomen: Non-distended, soft, non-tender  Skin: Warm, dry, no rashes.  Musculoskeletal: Gait is normal.  Moves all extremities well.  Extremities: No leg edema.  Psych:  Affect/mood is normal, judgement is good, memory is intact, grooming is appropriate.      Labs (10/20/21) were reviewed and discussed with patients.  All questions were answered.      Assessment and Plan    1. Type 2 diabetes mellitus without complication, without long-term current use of insulin (HCC)  - insulin glargine (INSULIN GLARGINE) 100 UNIT/ML Solution Pen-injector injection; Inject 60 Units under the skin every evening.  Dispense: 30 mL; Refill: 3  - metFORMIN ER (GLUCOPHAGE XR) 500 MG TABLET SR 24 HR; Take 2 Tablets by mouth 2 times a day for 90 days.  Dispense: 360 tablet; Refill: 3  - Comp Metabolic Panel; Future  - MICROALBUMIN CREAT RATIO URINE; Future  Pending appointment with endocrinology  2. Need for vaccination  - Pneumococal Polysaccharide Vaccine 23-Valent =>1YO SQ/IM    3. Microcytosis  - CBC WITH DIFFERENTIAL; Future    4. Dyslipidemia  - TSH WITH REFLEX TO FT4; Future    5. " Vitamin D deficiency  Replacement    6. Microalbuminuria  Lisinopril    7. Elevated alkaline phosphatase level  Isoenzyme unremarkable.Monitor.      Follow-up:Return in about 6 months (around 9/19/2021), or if symptoms worsen or fail to improve.    This note was created using voice recognition software. There may be unintended errors in spelling, grammar or content.

## 2021-03-19 NOTE — ASSESSMENT & PLAN NOTE
Ref. Range 10/20/2020 16:16   Alkaline Phosphatase Latest Ref Range: 30 - 99 U/L 115 (H)   February 2021 Alkaline phosphatase isoenzyme liver fraction 56%, bone fraction 24%, within normal limits.  Intestinal fraction 20% slightly elevated, upper limit normal of 18%.  He does not have any GI symptoms.  We will continue to monitor.

## 2021-03-19 NOTE — ASSESSMENT & PLAN NOTE
March 10, 2021 total cholesterol 148, triglyceride 151, HDL 38, LDL 84  Controlled on Crestor 5 mg daily   Ref. Range 10/20/2020 16:16   Cholesterol,Tot Latest Ref Range: 100 - 199 mg/dL 231 (H)   Triglycerides Latest Ref Range: 0 - 149 mg/dL 193 (H)   HDL Latest Ref Range: >=40 mg/dL 40   LDL Latest Ref Range: <100 mg/dL 152 (H)

## 2021-03-19 NOTE — ASSESSMENT & PLAN NOTE
Diabetic diet, monitor BG , Pending appointment with endocrinology in May 2021  March 10, 2021 A1c 8.8.   Ref. Range 10/15/2020 16:03   Glycohemoglobin Latest Ref Range: 0.0 - 5.6 % 11.1 (A)   metformin ER 1000 mg BID  Currently using Basaglar pen 60 units every evening.  If hypoglycemia occurs, or fasting level < 80 mg/dL, reduce bedtime dose by 4 units or 10%- whichever is greater.  10/15/2020 Monofilament testing with a 10 gram force: sensation intact: intact bilaterally  Visual Inspection: Feet with maceration of 4th toe web bilaterally, recommend topical OTC antifungal  Pedal pulses: intact bilaterally

## 2021-05-16 DIAGNOSIS — E78.5 DYSLIPIDEMIA: ICD-10-CM

## 2021-05-17 RX ORDER — ROSUVASTATIN CALCIUM 5 MG/1
TABLET, COATED ORAL
Qty: 30 TABLET | Refills: 5 | Status: SHIPPED | OUTPATIENT
Start: 2021-05-17 | End: 2021-11-10

## 2021-09-17 ENCOUNTER — APPOINTMENT (OUTPATIENT)
Dept: MEDICAL GROUP | Facility: MEDICAL CENTER | Age: 36
End: 2021-09-17
Payer: COMMERCIAL

## 2021-11-10 DIAGNOSIS — E78.5 DYSLIPIDEMIA: ICD-10-CM

## 2021-11-10 RX ORDER — ROSUVASTATIN CALCIUM 5 MG/1
TABLET, COATED ORAL
Qty: 90 TABLET | Refills: 1 | Status: SHIPPED | OUTPATIENT
Start: 2021-11-10 | End: 2022-05-13

## 2021-11-10 NOTE — TELEPHONE ENCOUNTER
Received request via: Pharmacy    Was the patient seen in the last year in this department? Yes  3/19/2021  Does the patient have an active prescription (recently filled or refills available) for medication(s) requested? No

## 2022-01-20 DIAGNOSIS — R80.9 MICROALBUMINURIA: ICD-10-CM

## 2022-01-20 RX ORDER — LISINOPRIL 2.5 MG/1
TABLET ORAL
Qty: 90 TABLET | Refills: 0 | Status: SHIPPED | OUTPATIENT
Start: 2022-01-20 | End: 2022-04-22

## 2022-04-22 DIAGNOSIS — E11.29 TYPE 2 DIABETES MELLITUS WITH MICROALBUMINURIA, WITH LONG-TERM CURRENT USE OF INSULIN (HCC): ICD-10-CM

## 2022-04-22 DIAGNOSIS — R80.9 TYPE 2 DIABETES MELLITUS WITH MICROALBUMINURIA, WITH LONG-TERM CURRENT USE OF INSULIN (HCC): ICD-10-CM

## 2022-04-22 DIAGNOSIS — E55.9 VITAMIN D DEFICIENCY: ICD-10-CM

## 2022-04-22 DIAGNOSIS — R80.9 MICROALBUMINURIA: ICD-10-CM

## 2022-04-22 DIAGNOSIS — R71.8 MICROCYTOSIS: ICD-10-CM

## 2022-04-22 DIAGNOSIS — Z79.4 TYPE 2 DIABETES MELLITUS WITH MICROALBUMINURIA, WITH LONG-TERM CURRENT USE OF INSULIN (HCC): ICD-10-CM

## 2022-04-22 DIAGNOSIS — E78.5 DYSLIPIDEMIA: ICD-10-CM

## 2022-04-22 NOTE — PROGRESS NOTES
He needs an appointment   He is due for lab.     Type 2 diabetes mellitus with microalbuminuria, with long-term current use of insulin (HCC)  -     Comp Metabolic Panel; Future  -     HEMOGLOBIN A1C; Future  -     MICROALBUMIN CREAT RATIO URINE; Future    Microalbuminuria  -     MICROALBUMIN CREAT RATIO URINE; Future    Dyslipidemia  -     Lipid Profile; Future  -     TSH WITH REFLEX TO FT4; Future    Microcytosis  -     CBC WITH DIFFERENTIAL; Future    Vitamin D deficiency  -     VITAMIN D,25 HYDROXY; Future